# Patient Record
Sex: MALE | URBAN - METROPOLITAN AREA
[De-identification: names, ages, dates, MRNs, and addresses within clinical notes are randomized per-mention and may not be internally consistent; named-entity substitution may affect disease eponyms.]

---

## 2018-12-28 ENCOUNTER — INPATIENT (INPATIENT)
Facility: HOSPITAL | Age: 58
LOS: 1 days | Discharge: HOME | DRG: 247 | End: 2018-12-30
Attending: INTERNAL MEDICINE | Admitting: INTERNAL MEDICINE
Payer: COMMERCIAL

## 2018-12-28 VITALS
DIASTOLIC BLOOD PRESSURE: 72 MMHG | SYSTOLIC BLOOD PRESSURE: 119 MMHG | RESPIRATION RATE: 19 BRPM | HEART RATE: 92 BPM | OXYGEN SATURATION: 95 %

## 2018-12-28 DIAGNOSIS — Z91.89 OTHER SPECIFIED PERSONAL RISK FACTORS, NOT ELSEWHERE CLASSIFIED: ICD-10-CM

## 2018-12-28 DIAGNOSIS — E11.9 TYPE 2 DIABETES MELLITUS WITHOUT COMPLICATIONS: ICD-10-CM

## 2018-12-28 DIAGNOSIS — E78.5 HYPERLIPIDEMIA, UNSPECIFIED: ICD-10-CM

## 2018-12-28 DIAGNOSIS — R63.8 OTHER SYMPTOMS AND SIGNS CONCERNING FOOD AND FLUID INTAKE: ICD-10-CM

## 2018-12-28 DIAGNOSIS — I25.110 ATHEROSCLEROTIC HEART DISEASE OF NATIVE CORONARY ARTERY WITH UNSTABLE ANGINA PECTORIS: ICD-10-CM

## 2018-12-28 DIAGNOSIS — I21.3 ST ELEVATION (STEMI) MYOCARDIAL INFARCTION OF UNSPECIFIED SITE: ICD-10-CM

## 2018-12-28 DIAGNOSIS — Z29.9 ENCOUNTER FOR PROPHYLACTIC MEASURES, UNSPECIFIED: ICD-10-CM

## 2018-12-28 LAB
ALBUMIN SERPL ELPH-MCNC: 4.4 G/DL — SIGNIFICANT CHANGE UP (ref 3.3–5)
ALP SERPL-CCNC: 93 U/L — SIGNIFICANT CHANGE UP (ref 40–120)
ALT FLD-CCNC: 26 U/L — SIGNIFICANT CHANGE UP (ref 10–45)
ANION GAP SERPL CALC-SCNC: 20 MMOL/L — HIGH (ref 5–17)
APTT BLD: 53.5 SEC — HIGH (ref 27.5–36.3)
AST SERPL-CCNC: 23 U/L — SIGNIFICANT CHANGE UP (ref 10–40)
BILIRUB SERPL-MCNC: 0.3 MG/DL — SIGNIFICANT CHANGE UP (ref 0.2–1.2)
BUN SERPL-MCNC: 17 MG/DL — SIGNIFICANT CHANGE UP (ref 7–23)
CALCIUM SERPL-MCNC: 9.3 MG/DL — SIGNIFICANT CHANGE UP (ref 8.4–10.5)
CHLORIDE SERPL-SCNC: 102 MMOL/L — SIGNIFICANT CHANGE UP (ref 96–108)
CHOLEST SERPL-MCNC: 185 MG/DL — SIGNIFICANT CHANGE UP (ref 10–199)
CO2 SERPL-SCNC: 20 MMOL/L — LOW (ref 22–31)
CREAT SERPL-MCNC: 0.69 MG/DL — SIGNIFICANT CHANGE UP (ref 0.5–1.3)
GLUCOSE SERPL-MCNC: 186 MG/DL — HIGH (ref 70–99)
HBA1C BLD-MCNC: 7.9 % — HIGH (ref 4–5.6)
HCT VFR BLD CALC: 46.4 % — SIGNIFICANT CHANGE UP (ref 39–50)
HDLC SERPL-MCNC: 43 MG/DL — SIGNIFICANT CHANGE UP
HGB BLD-MCNC: 15.5 G/DL — SIGNIFICANT CHANGE UP (ref 13–17)
INR BLD: 0.97 — SIGNIFICANT CHANGE UP (ref 0.88–1.16)
LIPID PNL WITH DIRECT LDL SERPL: 117 MG/DL — SIGNIFICANT CHANGE UP
MAGNESIUM SERPL-MCNC: 2.4 MG/DL — SIGNIFICANT CHANGE UP (ref 1.6–2.6)
MCHC RBC-ENTMCNC: 30.1 PG — SIGNIFICANT CHANGE UP (ref 27–34)
MCHC RBC-ENTMCNC: 33.4 G/DL — SIGNIFICANT CHANGE UP (ref 32–36)
MCV RBC AUTO: 90.1 FL — SIGNIFICANT CHANGE UP (ref 80–100)
PLATELET # BLD AUTO: 327 K/UL — SIGNIFICANT CHANGE UP (ref 150–400)
POTASSIUM SERPL-MCNC: 4.1 MMOL/L — SIGNIFICANT CHANGE UP (ref 3.5–5.3)
POTASSIUM SERPL-SCNC: 4.1 MMOL/L — SIGNIFICANT CHANGE UP (ref 3.5–5.3)
PROT SERPL-MCNC: 7.8 G/DL — SIGNIFICANT CHANGE UP (ref 6–8.3)
PROTHROM AB SERPL-ACNC: 11 SEC — SIGNIFICANT CHANGE UP (ref 10–12.9)
RBC # BLD: 5.15 M/UL — SIGNIFICANT CHANGE UP (ref 4.2–5.8)
RBC # FLD: 13.3 % — SIGNIFICANT CHANGE UP (ref 10.3–16.9)
SODIUM SERPL-SCNC: 142 MMOL/L — SIGNIFICANT CHANGE UP (ref 135–145)
TOTAL CHOLESTEROL/HDL RATIO MEASUREMENT: 4.3 RATIO — SIGNIFICANT CHANGE UP (ref 3.4–9.6)
TRIGL SERPL-MCNC: 125 MG/DL — SIGNIFICANT CHANGE UP (ref 10–149)
TSH SERPL-MCNC: 1.74 UIU/ML — SIGNIFICANT CHANGE UP (ref 0.35–4.94)
WBC # BLD: 17.1 K/UL — HIGH (ref 3.8–10.5)
WBC # FLD AUTO: 17.1 K/UL — HIGH (ref 3.8–10.5)

## 2018-12-28 PROCEDURE — 92928 PRQ TCAT PLMT NTRAC ST 1 LES: CPT | Mod: RC

## 2018-12-28 PROCEDURE — 93458 L HRT ARTERY/VENTRICLE ANGIO: CPT | Mod: 26,XU

## 2018-12-28 RX ORDER — HEPARIN SODIUM 5000 [USP'U]/ML
5000 INJECTION INTRAVENOUS; SUBCUTANEOUS EVERY 8 HOURS
Qty: 0 | Refills: 0 | Status: DISCONTINUED | OUTPATIENT
Start: 2018-12-28 | End: 2018-12-30

## 2018-12-28 RX ORDER — ONDANSETRON 8 MG/1
4 TABLET, FILM COATED ORAL ONCE
Qty: 0 | Refills: 0 | Status: COMPLETED | OUTPATIENT
Start: 2018-12-28 | End: 2018-12-28

## 2018-12-28 RX ORDER — ASPIRIN/CALCIUM CARB/MAGNESIUM 324 MG
81 TABLET ORAL DAILY
Qty: 0 | Refills: 0 | Status: DISCONTINUED | OUTPATIENT
Start: 2018-12-29 | End: 2018-12-30

## 2018-12-28 RX ORDER — DEXTROSE 50 % IN WATER 50 %
25 SYRINGE (ML) INTRAVENOUS ONCE
Qty: 0 | Refills: 0 | Status: DISCONTINUED | OUTPATIENT
Start: 2018-12-28 | End: 2018-12-30

## 2018-12-28 RX ORDER — TICAGRELOR 90 MG/1
90 TABLET ORAL
Qty: 0 | Refills: 0 | Status: DISCONTINUED | OUTPATIENT
Start: 2018-12-29 | End: 2018-12-30

## 2018-12-28 RX ORDER — DEXTROSE 50 % IN WATER 50 %
15 SYRINGE (ML) INTRAVENOUS ONCE
Qty: 0 | Refills: 0 | Status: DISCONTINUED | OUTPATIENT
Start: 2018-12-28 | End: 2018-12-30

## 2018-12-28 RX ORDER — CHLORHEXIDINE GLUCONATE 213 G/1000ML
1 SOLUTION TOPICAL DAILY
Qty: 0 | Refills: 0 | Status: DISCONTINUED | OUTPATIENT
Start: 2018-12-28 | End: 2018-12-30

## 2018-12-28 RX ORDER — GLUCAGON INJECTION, SOLUTION 0.5 MG/.1ML
1 INJECTION, SOLUTION SUBCUTANEOUS ONCE
Qty: 0 | Refills: 0 | Status: DISCONTINUED | OUTPATIENT
Start: 2018-12-28 | End: 2018-12-30

## 2018-12-28 RX ORDER — INSULIN LISPRO 100/ML
VIAL (ML) SUBCUTANEOUS
Qty: 0 | Refills: 0 | Status: DISCONTINUED | OUTPATIENT
Start: 2018-12-28 | End: 2018-12-30

## 2018-12-28 RX ORDER — DEXTROSE 50 % IN WATER 50 %
12.5 SYRINGE (ML) INTRAVENOUS ONCE
Qty: 0 | Refills: 0 | Status: DISCONTINUED | OUTPATIENT
Start: 2018-12-28 | End: 2018-12-30

## 2018-12-28 RX ORDER — ATORVASTATIN CALCIUM 80 MG/1
80 TABLET, FILM COATED ORAL AT BEDTIME
Qty: 0 | Refills: 0 | Status: DISCONTINUED | OUTPATIENT
Start: 2018-12-29 | End: 2018-12-30

## 2018-12-28 RX ORDER — SODIUM CHLORIDE 9 MG/ML
1000 INJECTION, SOLUTION INTRAVENOUS
Qty: 0 | Refills: 0 | Status: DISCONTINUED | OUTPATIENT
Start: 2018-12-28 | End: 2018-12-30

## 2018-12-28 RX ADMIN — Medication 2: at 21:57

## 2018-12-28 RX ADMIN — ONDANSETRON 4 MILLIGRAM(S): 8 TABLET, FILM COATED ORAL at 22:42

## 2018-12-28 RX ADMIN — CHLORHEXIDINE GLUCONATE 1 APPLICATION(S): 213 SOLUTION TOPICAL at 21:23

## 2018-12-28 NOTE — H&P ADULT - NSHPSOCIALHISTORY_GEN_ALL_CORE
Pt has 20 pack year smoking history (quick smoking 25 years ago), drinks EtOH socially (1/month), denies illicit drug use.

## 2018-12-28 NOTE — PROGRESS NOTE ADULT - SUBJECTIVE AND OBJECTIVE BOX
PROCEDURE: CORONARY ANGIOGRAM, LHC and PCI  INDICATION: STEMI  ATTENDING: FLO  ACCESS: RRA    FINDINGS:  LM= wnl  Lcx= 65%  OM1= 65%  LAD= diffuse moderate non obstructive CAD  D1= diffuse, moderate, non obstructive CAD  mRCA=99%     LVEF= 35-40% with inferior wall hypokinesis  LVEDP=  10 mmHg    PROCEDURE:   PCI of mRCA with CINDY x1 (Sarath 3.0x 32) post dilated to 3.5 with NC balloon    A/P  -CCU admit  -aspirin 81 mg daily   -brilinta 90 mg po bid  -atorvastatin 80 mg daily   -echo  -aggressive medical therapy of DM and comorbidities  -consider FFR LCx and possible PCI in 1 month if anginal symptoms on optimal medical therapy

## 2018-12-28 NOTE — H&P ADULT - ASSESSMENT
58 year old male with PMH of IDDM, HLD, and hx of tobacco use disorder that presents from [facility] with chest pain x 3 days found to have inferior STEMI. Pt was transferred to [facility] for emergent cardiac catheterization and s/p 1 CINDY to [facility]. Pt admitted to CCU for monitoring and management.

## 2018-12-28 NOTE — H&P ADULT - FAMILY HISTORY
Mother  Still living? Unknown  Family history of coronary artery bypass graft, Age at diagnosis: Age Unknown  Family history of coronary artery disease, Age at diagnosis: Age Unknown     Grandparent  Still living? Unknown  Family history of coronary artery disease, Age at diagnosis: Age Unknown

## 2018-12-28 NOTE — H&P ADULT - NSHPLABSRESULTS_GEN_ALL_CORE
.  LABS:                         RADIOLOGY, EKG & ADDITIONAL TESTS: Reviewed. .  LABS:                         15.5   17.1  )-----------( 327      ( 28 Dec 2018 21:21 )             46.4     12-28    142  |  102  |  17  ----------------------------<  186<H>  4.1   |  20<L>  |  0.69    Ca    9.3      28 Dec 2018 21:21  Mg     2.4     12-28    TPro  7.8  /  Alb  4.4  /  TBili  0.3  /  DBili  x   /  AST  23  /  ALT  26  /  AlkPhos  93  12-28    PT/INR - ( 28 Dec 2018 21:21 )   PT: 11.0 sec;   INR: 0.97          PTT - ( 28 Dec 2018 21:21 )  PTT:53.5 sec              RADIOLOGY, EKG & ADDITIONAL TESTS: Reviewed.

## 2018-12-28 NOTE — H&P ADULT - PROBLEM SELECTOR PROBLEM 4
Transition of care performed with sharing of clinical summary Hyperlipidemia, unspecified hyperlipidemia type

## 2018-12-28 NOTE — H&P ADULT - PROBLEM SELECTOR PLAN 4
Pt states he has hx of HLD and take Lipitor at home. He reports his last LDL to be wnl but his PCP instructed him to continue statin.   - c/w Lipitor 80mg  - f/u Lipid Panel

## 2018-12-28 NOTE — H&P ADULT - PROBLEM SELECTOR PROBLEM 2
Need for prophylactic measure Coronary artery disease involving native coronary artery of native heart with unstable angina pectoris

## 2018-12-28 NOTE — H&P ADULT - PROBLEM SELECTOR PLAN 2
Cardiac cath demonstrated LM= wnl, Lcx= 65%, OM1= 65%, LAD= diffuse moderate non obstructive CAD, D1= diffuse, moderate, non obstructive CAD, mRCA=99%  - c/w Lipitor 80mg  - management as per above Cardiac cath demonstrated LM= wnl, Lcx= 65%, OM1= 65%, LAD= diffuse moderate non obstructive CAD, D1= diffuse, moderate, non obstructive CAD, mRCA=99%  - c/w Lipitor 80mg  - Management as per above

## 2018-12-28 NOTE — H&P ADULT - NSHPPHYSICALEXAM_GEN_ALL_CORE
Constitutional: WDWN resting comfortably in bed; NAD  Head: NC/AT  Eyes: PERRL, EOMI, anicteric sclera  ENT: no nasal discharge; uvula midline, no oropharyngeal erythema or exudates; MMM  Neck: supple; no JVD or thyromegaly  Respiratory: CTA B/L; no W/R/R, no retractions  Cardiac: +S1/S2; RRR; no M/R/G; PMI non-displaced  Gastrointestinal: soft, NT/ND; no rebound or guarding; +BSx4  Genitourinary: normal external genitalia  Back: spine midline, no bony tenderness or step-offs; no CVAT B/L  Extremities: WWP, no clubbing or cyanosis; no peripheral edema  Musculoskeletal: NROM x4; no joint swelling, tenderness or erythema  Vascular: 2+ radial, femoral, DP/PT pulses B/L  Dermatologic: skin warm, dry and intact; no rashes, wounds, or scars  Lymphatic: no submandibular or cervical LAD  Neurologic: AAOx3; CNII-XII grossly intact; no focal deficits  Psychiatric: affect and characteristics of appearance, verbalizations, behaviors are appropriate Constitutional: WDWN resting comfortably in bed; NAD  Head: NC/AT  Eyes: PERRL, EOMI, anicteric sclera  ENT: no nasal discharge; uvula midline, no oropharyngeal erythema or exudates; MMM  Neck: supple; no JVD or thyromegaly  Respiratory: CTA B/L; no W/R/R, no retractions  Cardiac: +S1/S2; RRR; no M/R/G; PMI non-displaced  Gastrointestinal: soft, NT/ND; no rebound or guarding; +BSx4  Extremities: WWP, no clubbing or cyanosis; no peripheral edema, R radial band in place   Vascular: 2+ radial, femoral, DP/PT pulses B/L  Dermatologic: skin warm, dry and intact; no rashes, wounds, or scars  Neurologic: AAOx3; CNII-XII grossly intact; no focal deficits

## 2018-12-28 NOTE — H&P ADULT - HISTORY OF PRESENT ILLNESS
58 year old male with PMH 58 year old male with PMH of IDDM, HLD, and hx of tobacco use disorder that presents from [facility] for inferior STEMI. Pt states he has been experiencing chest pain x 3 days. Pt states he was experiencing 8/10 substernal chest pain with diaphoresis and associated numbness and tingling in bilateral UE.  He initially thought it was acid reflux and took TUMS with some alleviation. On the third day he was driving back home from shopping with return of symptoms which did not alleviate with TUMS which prompted him to go to the ED. At [place] pt was found to have an inferior wall STEMI (II, III, AVF) and STEMI code was activated. Pt was transferred to [facility] for emergent cardiac catheterization 58 year old male with PMH of IDDM, HLD, and hx of tobacco use disorder that presents from [facility] for inferior STEMI. Pt states he has been experiencing chest pain x 3 days. Pt states he was experiencing 8/10 substernal chest pain with diaphoresis and associated numbness and tingling in bilateral UE.  He initially thought it was acid reflux and took TUMS with some alleviation. On the third day he was driving back home from shopping with return of symptoms which did not alleviate with TUMS which prompted him to go to the ED. At [place] pt was found to have an inferior wall STEMI (II, III, AVF) and STEMI code was activated. Pt was transferred to [facility] for emergent cardiac catheterization. Cardiac cath demonstrated LM= wnl, Lcx= 65%, OM1= 65%, LAD= diffuse moderate non obstructive CAD, D1= diffuse, moderate, non obstructive CAD, mRCA=99%, LVEF= 35-40% with inferior wall hypokinesis, LVEDP=  10 mmHg. Pt received 1 CINDY to mRCA. Pt denies CP, SOB, Palpitation, F, Chills, or N/V.  Pt is being admitted to CCU for further monitoring and management. 58 year old male with PMH of IDDM, HLD, and hx of tobacco use disorder that presents from [facility] for inferior STEMI. Pt states he has been experiencing chest pain x 3 days. Pt states he was experiencing 8/10 substernal chest pain with diaphoresis and associated numbness and tingling in bilateral UE.  He initially thought it was acid reflux and took TUMS with some alleviation. On the third day he was driving back home from shopping with return of symptoms which did not alleviate with TUMS which prompted him to go to the ED. At [place] pt was found to have an inferior wall STEMI (II, III, AVF) and STEMI code was activated. Pt was transferred to [facility] for emergent cardiac catheterization. Cardiac cath demonstrated LM= wnl, Lcx= 65%, OM1= 65%, LAD= diffuse moderate non obstructive CAD, D1= diffuse, moderate, non obstructive CAD, mRCA=99%, LVEF= 35-40% with inferior wall hypokinesis, LVEDP=  10 mmHg. Pt received 1 CINDY to mRCA. Pt denies CP, SOB, Palpitation, F, Chills, or N/V.  Pt is being admitted to CCU for observation and further management.

## 2018-12-28 NOTE — H&P ADULT - PROBLEM SELECTOR PLAN 3
Pt states he has a hx of poorly controlled IDDM. Pt is unsure about what medications he takes. Pt admits he went to PCP 1 month ago but did not follow up on lab work.  - pharmacy closed , try to obtain collateral in AM  - will start on ISS   - f/u Hga1c Pt states he has a hx of poorly controlled IDDM. Pt is unsure about what medications he takes. Pt admits he went to PCP 1 month ago but did not follow up on lab work.  - Pt goes to [facility] in [facility]; pharmacy closed , try to obtain collateral in AM  - will start on ISS   - f/u Hga1c Pt states he has a hx of poorly controlled IDDM. Pt is unsure about what medications he takes. Pt admits he went to PCP 1 month ago but did not follow up on lab work.  - Pt goes to [facility] in [facility]; pharmacy closed , try to obtain collateral in AM  - Will start on ISS   - f/u Hga1c

## 2018-12-28 NOTE — H&P ADULT - PROBLEM SELECTOR PLAN 1
Pt presents from [facility] with chest pain x 3 days found to have inferior STEMI (II, III, aVF) s/p 1 CIDNY to mRCA. Pt presents from [facility] with chest pain x 3 days found to have inferior STEMI (II, III, aVF) [facility]  - pt was ASA and Brilinta loaded  -ASA 81 mg daily   -brilinta 90 mg po bid starting in AM   - atorvastatin 80mg  - f/u CBC, CMP, Lipid Panel. Hga1c, and TSH   - obtain formal Echo   - daily EKG and CXR  - monitor on Telemetry   - will possibly need PCI to LCx in 1 month if pt continues to have anginal symptoms on optimal therapy. Pt presents from [facility] with chest pain x 3 days found to have inferior STEMI (II, III, aVF) [facility]  - pt was ASA and Brilinta loaded  -ASA 81 mg daily   -Brilinta 90 mg po bid starting in AM   - Atorvastatin 80mg  - f/u CBC, CMP, Lipid Panel. Hga1c, and TSH   - Obtain formal Echo   - Daily EKG and CXR  - Monitor on Telemetry   - Will possibly need PCI to LCx in 1 month if pt continues to have anginal symptoms on optimal therapy.

## 2018-12-29 LAB
ANION GAP SERPL CALC-SCNC: 17 MMOL/L — SIGNIFICANT CHANGE UP (ref 5–17)
BUN SERPL-MCNC: 15 MG/DL — SIGNIFICANT CHANGE UP (ref 7–23)
CALCIUM SERPL-MCNC: 9.7 MG/DL — SIGNIFICANT CHANGE UP (ref 8.4–10.5)
CHLORIDE SERPL-SCNC: 101 MMOL/L — SIGNIFICANT CHANGE UP (ref 96–108)
CO2 SERPL-SCNC: 23 MMOL/L — SIGNIFICANT CHANGE UP (ref 22–31)
CREAT SERPL-MCNC: 0.72 MG/DL — SIGNIFICANT CHANGE UP (ref 0.5–1.3)
GLUCOSE SERPL-MCNC: 183 MG/DL — HIGH (ref 70–99)
HCT VFR BLD CALC: 44.2 % — SIGNIFICANT CHANGE UP (ref 39–50)
HGB BLD-MCNC: 14.8 G/DL — SIGNIFICANT CHANGE UP (ref 13–17)
MAGNESIUM SERPL-MCNC: 2.4 MG/DL — SIGNIFICANT CHANGE UP (ref 1.6–2.6)
MCHC RBC-ENTMCNC: 30.5 PG — SIGNIFICANT CHANGE UP (ref 27–34)
MCHC RBC-ENTMCNC: 33.5 G/DL — SIGNIFICANT CHANGE UP (ref 32–36)
MCV RBC AUTO: 90.9 FL — SIGNIFICANT CHANGE UP (ref 80–100)
PLATELET # BLD AUTO: 304 K/UL — SIGNIFICANT CHANGE UP (ref 150–400)
POTASSIUM SERPL-MCNC: 4.2 MMOL/L — SIGNIFICANT CHANGE UP (ref 3.5–5.3)
POTASSIUM SERPL-SCNC: 4.2 MMOL/L — SIGNIFICANT CHANGE UP (ref 3.5–5.3)
RBC # BLD: 4.86 M/UL — SIGNIFICANT CHANGE UP (ref 4.2–5.8)
RBC # FLD: 13.5 % — SIGNIFICANT CHANGE UP (ref 10.3–16.9)
SODIUM SERPL-SCNC: 141 MMOL/L — SIGNIFICANT CHANGE UP (ref 135–145)
WBC # BLD: 11.9 K/UL — HIGH (ref 3.8–10.5)
WBC # FLD AUTO: 11.9 K/UL — HIGH (ref 3.8–10.5)

## 2018-12-29 PROCEDURE — 93306 TTE W/DOPPLER COMPLETE: CPT | Mod: 26,76

## 2018-12-29 PROCEDURE — 71045 X-RAY EXAM CHEST 1 VIEW: CPT | Mod: 26

## 2018-12-29 RX ORDER — METOPROLOL TARTRATE 50 MG
12.5 TABLET ORAL
Qty: 0 | Refills: 0 | Status: DISCONTINUED | OUTPATIENT
Start: 2018-12-29 | End: 2018-12-29

## 2018-12-29 RX ORDER — LISINOPRIL 2.5 MG/1
2.5 TABLET ORAL DAILY
Qty: 0 | Refills: 0 | Status: DISCONTINUED | OUTPATIENT
Start: 2018-12-29 | End: 2018-12-30

## 2018-12-29 RX ORDER — SODIUM CHLORIDE 9 MG/ML
250 INJECTION INTRAMUSCULAR; INTRAVENOUS; SUBCUTANEOUS ONCE
Qty: 0 | Refills: 0 | Status: COMPLETED | OUTPATIENT
Start: 2018-12-29 | End: 2018-12-29

## 2018-12-29 RX ADMIN — TICAGRELOR 90 MILLIGRAM(S): 90 TABLET ORAL at 06:29

## 2018-12-29 RX ADMIN — TICAGRELOR 90 MILLIGRAM(S): 90 TABLET ORAL at 18:16

## 2018-12-29 RX ADMIN — Medication 2: at 11:45

## 2018-12-29 RX ADMIN — Medication 81 MILLIGRAM(S): at 18:16

## 2018-12-29 RX ADMIN — ATORVASTATIN CALCIUM 80 MILLIGRAM(S): 80 TABLET, FILM COATED ORAL at 21:51

## 2018-12-29 RX ADMIN — Medication 12.5 MILLIGRAM(S): at 00:12

## 2018-12-29 RX ADMIN — Medication 4: at 16:46

## 2018-12-29 RX ADMIN — HEPARIN SODIUM 5000 UNIT(S): 5000 INJECTION INTRAVENOUS; SUBCUTANEOUS at 06:29

## 2018-12-29 RX ADMIN — Medication 4: at 07:24

## 2018-12-29 RX ADMIN — HEPARIN SODIUM 5000 UNIT(S): 5000 INJECTION INTRAVENOUS; SUBCUTANEOUS at 14:19

## 2018-12-29 RX ADMIN — Medication 4: at 21:50

## 2018-12-29 RX ADMIN — LISINOPRIL 2.5 MILLIGRAM(S): 2.5 TABLET ORAL at 11:58

## 2018-12-29 RX ADMIN — HEPARIN SODIUM 5000 UNIT(S): 5000 INJECTION INTRAVENOUS; SUBCUTANEOUS at 21:51

## 2018-12-29 RX ADMIN — CHLORHEXIDINE GLUCONATE 1 APPLICATION(S): 213 SOLUTION TOPICAL at 18:16

## 2018-12-29 RX ADMIN — SODIUM CHLORIDE 1000 MILLILITER(S): 9 INJECTION INTRAMUSCULAR; INTRAVENOUS; SUBCUTANEOUS at 01:33

## 2018-12-29 NOTE — PROGRESS NOTE ADULT - ATTENDING COMMENTS
58M w/ DM, HTN, HLD, former smoker tx'd from [facility] w/ inf STEMI, admitted to CCU for further mgmt    -s/p LHC w/ CINDY to mRCA  -O/n had some mild chest discomfort, no new EKG changes  -Currently CP free and HD stable  -c/w DAPT and Lipitor  -Will start low dose BB when HR tolerates(Sinus michelle)  -Start Lisinopril 2.5 daily  -f/u TTE  -Insulin SS  -DASH Diet  -OOB to chair  -Dispo: CCU; Possible d/c home tmrw    Isaura Alvarado MD  CCU Attending

## 2018-12-29 NOTE — PROGRESS NOTE ADULT - PROBLEM SELECTOR PLAN 2
Cardiac cath demonstrated LM= wnl, Lcx= 65%, OM1= 65%, LAD= diffuse moderate non obstructive CAD, D1= diffuse, moderate, non obstructive CAD, mRCA=99%  - c/w Lipitor 80mg  - Management as per above

## 2018-12-29 NOTE — PROGRESS NOTE ADULT - PROBLEM SELECTOR PLAN 4
Pt states he has hx of HLD and take Lipitor at home. He reports his last LDL to be wnl but his PCP instructed him to continue statin.   - c/w Lipitor 80mg  - f/u Lipid Panel Pt states he has hx of HLD and take Lipitor at home. He reports his last LDL to be wnl but his PCP instructed him to continue statin.   - c/w Lipitor 80mg  - lipids wnl

## 2018-12-29 NOTE — PROGRESS NOTE ADULT - SUBJECTIVE AND OBJECTIVE BOX
INTERVENTIONAL CARDIOLOGY FOLLOW UP NOTE    -Patient seen and examined this am  -No events overnight  -No complaints this am    VASCULAR ACCESS EXAM:     RADIAL:   2+ right/left radial pulse   Normal capillary refill. No evidence of motor or sensory deficits of digits, hand, wrist or forearm.   -Access site clean, non-tender, without ecchymosis or hematoma.    A/P  S/p PCI via radial approach with no evidence of vascular complications post procedure.     -continue with current medications including dual antiplatelet therapy   -discharge instructions as per protocol   -outpatient follow up with primary cardiologist

## 2018-12-29 NOTE — PROGRESS NOTE ADULT - ASSESSMENT
58 year old male with PMH of IDDM, HLD, and hx of tobacco use disorder that presents from [facility] with chest pain x 3 days found to have inferior STEMI. Pt was transferred to [facility] for emergent cardiac catheterization and s/p 1 CINDY to [facility]. Pt admitted to CCU for monitoring and management.

## 2018-12-29 NOTE — PROGRESS NOTE ADULT - PROBLEM SELECTOR PLAN 1
Pt presents from [facility] with chest pain x 3 days found to have inferior STEMI (II, III, aVF) [facility]  - pt was ASA and Brilinta loaded  -ASA 81 mg daily   -Brilinta 90 mg po bid starting in AM   - Atorvastatin 80mg  - f/u CBC, CMP, Lipid Panel. Hga1c, and TSH   - Obtain formal Echo   - Daily EKG and CXR  - Monitor on Telemetry   - Will possibly need PCI to LCx in 1 month if pt continues to have anginal symptoms on optimal therapy. Pt presents from [facility] with chest pain x 3 days found to have inferior STEMI (II, III, aVF) s/p 1 CINDY to mRCA.  - pt was ASA and Brilinta loaded  -ASA 81 mg daily   -Brilinta 90 mg po bid starting in AM   - 2.5mg lisinopril  - will start lopressor when HR can tolerate  - Atorvastatin 80mg  - f/u CBC, CMP, Lipid Panel. Hga1c, and TSH   - Obtain formal Echo   - Daily EKG and CXR  - Monitor on Telemetry   - Will possibly need PCI to LCx in 1 month if pt continues to have anginal symptoms on optimal therapy.

## 2018-12-29 NOTE — PROGRESS NOTE ADULT - PROBLEM SELECTOR PLAN 3
Pt states he has a hx of poorly controlled IDDM. Pt is unsure about what medications he takes. Pt admits he went to PCP 1 month ago but did not follow up on lab work.  - Pt goes to [facility]; pharmacy closed , try to obtain collateral in AM  - Will start on ISS   - f/u Hga1c Pt states he has a hx of poorly controlled IDDM. Pt is unsure about what medications he takes. Pt admits he went to PCP 1 month ago but did not follow up on lab work.  - Pt goes to [facility] in [facility]; pharmacy did not . Will reattempt in AM.  - ISS   - Hga1c 7.9

## 2018-12-29 NOTE — PROGRESS NOTE ADULT - SUBJECTIVE AND OBJECTIVE BOX
OVERNIGHT EVENTS:    SUBJECTIVE / INTERVAL HPI: Patient seen and examined at bedside.     VITAL SIGNS:  Vital Signs Last 24 Hrs  T(C): 37.1 (29 Dec 2018 05:07), Max: 37.1 (29 Dec 2018 05:07)  T(F): 98.7 (29 Dec 2018 05:07), Max: 98.7 (29 Dec 2018 05:07)  HR: 48 (29 Dec 2018 07:00) (44 - 102)  BP: 124/62 (29 Dec 2018 07:00) (94/73 - 140/76)  BP(mean): 95 (29 Dec 2018 07:00) (74 - 99)  RR: 20 (29 Dec 2018 07:00) (16 - 28)  SpO2: 98% (29 Dec 2018 07:00) (95% - 99%)    PHYSICAL EXAM:    General: WDWN  HEENT: NC/AT; PERRL, anicteric sclera; MMM  Neck: supple  Cardiovascular: +S1/S2, RRR  Respiratory: CTA B/L; no W/R/R  Gastrointestinal: soft, NT/ND; +BSx4  Extremities: WWP; no edema, clubbing or cyanosis  Vascular: 2+ radial, DP/PT pulses B/L  Neurological: AAOx3; no focal deficits    MEDICATIONS:  MEDICATIONS  (STANDING):  aspirin enteric coated 81 milliGRAM(s) Oral daily  atorvastatin 80 milliGRAM(s) Oral at bedtime  chlorhexidine 2% Cloths 1 Application(s) Topical daily  dextrose 5%. 1000 milliLiter(s) (50 mL/Hr) IV Continuous <Continuous>  dextrose 50% Injectable 12.5 Gram(s) IV Push once  dextrose 50% Injectable 25 Gram(s) IV Push once  dextrose 50% Injectable 25 Gram(s) IV Push once  heparin  Injectable 5000 Unit(s) SubCutaneous every 8 hours  insulin lispro (HumaLOG) corrective regimen sliding scale   SubCutaneous Before meals and at bedtime  metoprolol tartrate 12.5 milliGRAM(s) Oral two times a day  ticagrelor 90 milliGRAM(s) Oral two times a day    MEDICATIONS  (PRN):  dextrose 40% Gel 15 Gram(s) Oral once PRN Blood Glucose LESS THAN 70 milliGRAM(s)/deciliter  glucagon  Injectable 1 milliGRAM(s) IntraMuscular once PRN Glucose LESS THAN 70 milligrams/deciliter      ALLERGIES:  Allergies    No Known Allergies    Intolerances        LABS:                        14.8   11.9  )-----------( 304      ( 29 Dec 2018 06:26 )             44.2     12-29    141  |  101  |  15  ----------------------------<  183<H>  4.2   |  23  |  0.72    Ca    9.7      29 Dec 2018 06:26  Mg     2.4     12-29    TPro  7.8  /  Alb  4.4  /  TBili  0.3  /  DBili  x   /  AST  23  /  ALT  26  /  AlkPhos  93  12-28    PT/INR - ( 28 Dec 2018 21:21 )   PT: 11.0 sec;   INR: 0.97          PTT - ( 28 Dec 2018 21:21 )  PTT:53.5 sec    CAPILLARY BLOOD GLUCOSE      POCT Blood Glucose.: 215 mg/dL (29 Dec 2018 06:27)      RADIOLOGY & ADDITIONAL TESTS: Reviewed. OVERNIGHT EVENTS: Arrived to CCU from cath lab HDS, complained at 10:40PM of nausea, lightheadedness and 4/10 chest pain, explained to patient that was not unusual for inferior wall lesion and was given zofran 4mg IV which helped, 3 beats of vtach x2 and later another 6 beats while fellow removing radial band, started on lopressor 12.5mg BID, had another episode of chest pain and repeat EKG without any changes, had 4 more beats of vtach and AIVR-given 250cc bolus, 6 more beats of vtach    SUBJECTIVE / INTERVAL HPI: Patient seen and examined at bedside.     VITAL SIGNS:  Vital Signs Last 24 Hrs  T(C): 37.1 (29 Dec 2018 05:07), Max: 37.1 (29 Dec 2018 05:07)  T(F): 98.7 (29 Dec 2018 05:07), Max: 98.7 (29 Dec 2018 05:07)  HR: 48 (29 Dec 2018 07:00) (44 - 102)  BP: 124/62 (29 Dec 2018 07:00) (94/73 - 140/76)  BP(mean): 95 (29 Dec 2018 07:00) (74 - 99)  RR: 20 (29 Dec 2018 07:00) (16 - 28)  SpO2: 98% (29 Dec 2018 07:00) (95% - 99%)    PHYSICAL EXAM:    General: WDWN  HEENT: NC/AT; PERRL, anicteric sclera; MMM  Neck: supple  Cardiovascular: +S1/S2, RRR  Respiratory: CTA B/L; no W/R/R  Gastrointestinal: soft, NT/ND; +BSx4  Extremities: WWP; no edema, clubbing or cyanosis  Vascular: 2+ radial, DP/PT pulses B/L  Neurological: AAOx3; no focal deficits    MEDICATIONS:  MEDICATIONS  (STANDING):  aspirin enteric coated 81 milliGRAM(s) Oral daily  atorvastatin 80 milliGRAM(s) Oral at bedtime  chlorhexidine 2% Cloths 1 Application(s) Topical daily  dextrose 5%. 1000 milliLiter(s) (50 mL/Hr) IV Continuous <Continuous>  dextrose 50% Injectable 12.5 Gram(s) IV Push once  dextrose 50% Injectable 25 Gram(s) IV Push once  dextrose 50% Injectable 25 Gram(s) IV Push once  heparin  Injectable 5000 Unit(s) SubCutaneous every 8 hours  insulin lispro (HumaLOG) corrective regimen sliding scale   SubCutaneous Before meals and at bedtime  metoprolol tartrate 12.5 milliGRAM(s) Oral two times a day  ticagrelor 90 milliGRAM(s) Oral two times a day    MEDICATIONS  (PRN):  dextrose 40% Gel 15 Gram(s) Oral once PRN Blood Glucose LESS THAN 70 milliGRAM(s)/deciliter  glucagon  Injectable 1 milliGRAM(s) IntraMuscular once PRN Glucose LESS THAN 70 milligrams/deciliter      ALLERGIES:  Allergies    No Known Allergies    Intolerances        LABS:                        14.8   11.9  )-----------( 304      ( 29 Dec 2018 06:26 )             44.2     12-29    141  |  101  |  15  ----------------------------<  183<H>  4.2   |  23  |  0.72    Ca    9.7      29 Dec 2018 06:26  Mg     2.4     12-29    TPro  7.8  /  Alb  4.4  /  TBili  0.3  /  DBili  x   /  AST  23  /  ALT  26  /  AlkPhos  93  12-28    PT/INR - ( 28 Dec 2018 21:21 )   PT: 11.0 sec;   INR: 0.97          PTT - ( 28 Dec 2018 21:21 )  PTT:53.5 sec    CAPILLARY BLOOD GLUCOSE      POCT Blood Glucose.: 215 mg/dL (29 Dec 2018 06:27)      RADIOLOGY & ADDITIONAL TESTS: Reviewed. OVERNIGHT EVENTS: Arrived to CCU from cath lab HDS, complained at 10:40PM of nausea, lightheadedness and 4/10 chest pain, explained to patient that was not unusual for inferior wall lesion and was given zofran 4mg IV which helped, 3 beats of vtach x2 and later another 6 beats while fellow removing radial band, started on lopressor 12.5mg BID, had another episode of chest pain and repeat EKG without any changes, had 4 more beats of vtach and AIVR-given 250cc bolus, 6 more beats of vtach    SUBJECTIVE / INTERVAL HPI: Patient seen and examined at bedside. Had several episodes of mild (3-4/10) chest pain overnight. Also had occasional shortness of breath. Had episodes of ectopy today with 15 beats of NSVT at the longest. Had one episode of abdominal pain during the day at 10AM, EKG unchanged.     VITAL SIGNS:  Vital Signs Last 24 Hrs  T(C): 37.1 (29 Dec 2018 05:07), Max: 37.1 (29 Dec 2018 05:07)  T(F): 98.7 (29 Dec 2018 05:07), Max: 98.7 (29 Dec 2018 05:07)  HR: 48 (29 Dec 2018 07:00) (44 - 102)  BP: 124/62 (29 Dec 2018 07:00) (94/73 - 140/76)  BP(mean): 95 (29 Dec 2018 07:00) (74 - 99)  RR: 20 (29 Dec 2018 07:00) (16 - 28)  SpO2: 98% (29 Dec 2018 07:00) (95% - 99%)    PHYSICAL EXAM:    General: WDWN in NAD  HEENT: NC/AT; PERRL, anicteric sclera; MMM  Neck: supple  Cardiovascular: +S1/S2, RRR  Respiratory: CTA B/L; no W/R/R  Gastrointestinal: soft, NT/ND; +BSx4  Extremities: WWP; no edema, clubbing or cyanosis  Vascular: 2+ radial, DP/PT pulses B/L  Neurological: AAOx3; no focal deficits    MEDICATIONS:  MEDICATIONS  (STANDING):  aspirin enteric coated 81 milliGRAM(s) Oral daily  atorvastatin 80 milliGRAM(s) Oral at bedtime  chlorhexidine 2% Cloths 1 Application(s) Topical daily  dextrose 5%. 1000 milliLiter(s) (50 mL/Hr) IV Continuous <Continuous>  dextrose 50% Injectable 12.5 Gram(s) IV Push once  dextrose 50% Injectable 25 Gram(s) IV Push once  dextrose 50% Injectable 25 Gram(s) IV Push once  heparin  Injectable 5000 Unit(s) SubCutaneous every 8 hours  insulin lispro (HumaLOG) corrective regimen sliding scale   SubCutaneous Before meals and at bedtime  metoprolol tartrate 12.5 milliGRAM(s) Oral two times a day  ticagrelor 90 milliGRAM(s) Oral two times a day    MEDICATIONS  (PRN):  dextrose 40% Gel 15 Gram(s) Oral once PRN Blood Glucose LESS THAN 70 milliGRAM(s)/deciliter  glucagon  Injectable 1 milliGRAM(s) IntraMuscular once PRN Glucose LESS THAN 70 milligrams/deciliter      ALLERGIES:  Allergies    No Known Allergies    Intolerances        LABS:                        14.8   11.9  )-----------( 304      ( 29 Dec 2018 06:26 )             44.2     12-29    141  |  101  |  15  ----------------------------<  183<H>  4.2   |  23  |  0.72    Ca    9.7      29 Dec 2018 06:26  Mg     2.4     12-29    TPro  7.8  /  Alb  4.4  /  TBili  0.3  /  DBili  x   /  AST  23  /  ALT  26  /  AlkPhos  93  12-28    PT/INR - ( 28 Dec 2018 21:21 )   PT: 11.0 sec;   INR: 0.97          PTT - ( 28 Dec 2018 21:21 )  PTT:53.5 sec    CAPILLARY BLOOD GLUCOSE      POCT Blood Glucose.: 215 mg/dL (29 Dec 2018 06:27)      RADIOLOGY & ADDITIONAL TESTS: Reviewed.

## 2018-12-30 ENCOUNTER — INBOUND DOCUMENT (OUTPATIENT)
Age: 58
End: 2018-12-30

## 2018-12-30 VITALS
OXYGEN SATURATION: 95 % | HEART RATE: 92 BPM | RESPIRATION RATE: 27 BRPM | DIASTOLIC BLOOD PRESSURE: 67 MMHG | SYSTOLIC BLOOD PRESSURE: 101 MMHG

## 2018-12-30 LAB
ANION GAP SERPL CALC-SCNC: 19 MMOL/L — HIGH (ref 5–17)
BUN SERPL-MCNC: 19 MG/DL — SIGNIFICANT CHANGE UP (ref 7–23)
CALCIUM SERPL-MCNC: 8.4 MG/DL — SIGNIFICANT CHANGE UP (ref 8.4–10.5)
CHLORIDE SERPL-SCNC: 101 MMOL/L — SIGNIFICANT CHANGE UP (ref 96–108)
CO2 SERPL-SCNC: 17 MMOL/L — LOW (ref 22–31)
CREAT SERPL-MCNC: 0.74 MG/DL — SIGNIFICANT CHANGE UP (ref 0.5–1.3)
GLUCOSE SERPL-MCNC: 230 MG/DL — HIGH (ref 70–99)
POTASSIUM SERPL-MCNC: 4 MMOL/L — SIGNIFICANT CHANGE UP (ref 3.5–5.3)
POTASSIUM SERPL-SCNC: 4 MMOL/L — SIGNIFICANT CHANGE UP (ref 3.5–5.3)
SODIUM SERPL-SCNC: 137 MMOL/L — SIGNIFICANT CHANGE UP (ref 135–145)

## 2018-12-30 PROCEDURE — 71045 X-RAY EXAM CHEST 1 VIEW: CPT | Mod: 26

## 2018-12-30 RX ORDER — INSULIN GLARGINE 100 [IU]/ML
25 INJECTION, SOLUTION SUBCUTANEOUS
Qty: 1 | Refills: 0 | OUTPATIENT
Start: 2018-12-30

## 2018-12-30 RX ORDER — LISINOPRIL 2.5 MG/1
1 TABLET ORAL
Qty: 30 | Refills: 0 | OUTPATIENT
Start: 2018-12-30

## 2018-12-30 RX ORDER — ASPIRIN/CALCIUM CARB/MAGNESIUM 324 MG
1 TABLET ORAL
Qty: 30 | Refills: 0 | OUTPATIENT
Start: 2018-12-30

## 2018-12-30 RX ORDER — METOPROLOL TARTRATE 50 MG
12.5 TABLET ORAL ONCE
Qty: 0 | Refills: 0 | Status: DISCONTINUED | OUTPATIENT
Start: 2018-12-30 | End: 2018-12-30

## 2018-12-30 RX ORDER — ATORVASTATIN CALCIUM 80 MG/1
1 TABLET, FILM COATED ORAL
Qty: 30 | Refills: 0 | OUTPATIENT
Start: 2018-12-30

## 2018-12-30 RX ORDER — METFORMIN HYDROCHLORIDE 850 MG/1
1 TABLET ORAL
Qty: 60 | Refills: 0 | OUTPATIENT
Start: 2018-12-30 | End: 2019-01-28

## 2018-12-30 RX ORDER — TICAGRELOR 90 MG/1
1 TABLET ORAL
Qty: 180 | Refills: 4 | OUTPATIENT
Start: 2018-12-30 | End: 2020-03-23

## 2018-12-30 RX ORDER — METOPROLOL TARTRATE 50 MG
12.5 TABLET ORAL EVERY 12 HOURS
Qty: 0 | Refills: 0 | Status: DISCONTINUED | OUTPATIENT
Start: 2018-12-30 | End: 2018-12-30

## 2018-12-30 RX ORDER — TICAGRELOR 90 MG/1
1 TABLET ORAL
Qty: 60 | Refills: 0 | OUTPATIENT
Start: 2018-12-30 | End: 2019-01-28

## 2018-12-30 RX ORDER — METOPROLOL TARTRATE 50 MG
1 TABLET ORAL
Qty: 30 | Refills: 0 | OUTPATIENT
Start: 2018-12-30

## 2018-12-30 RX ORDER — METOPROLOL TARTRATE 50 MG
25 TABLET ORAL DAILY
Qty: 0 | Refills: 0 | Status: DISCONTINUED | OUTPATIENT
Start: 2018-12-31 | End: 2018-12-30

## 2018-12-30 RX ADMIN — HEPARIN SODIUM 5000 UNIT(S): 5000 INJECTION INTRAVENOUS; SUBCUTANEOUS at 14:31

## 2018-12-30 RX ADMIN — LISINOPRIL 2.5 MILLIGRAM(S): 2.5 TABLET ORAL at 06:40

## 2018-12-30 RX ADMIN — Medication 12.5 MILLIGRAM(S): at 09:25

## 2018-12-30 RX ADMIN — TICAGRELOR 90 MILLIGRAM(S): 90 TABLET ORAL at 06:40

## 2018-12-30 RX ADMIN — HEPARIN SODIUM 5000 UNIT(S): 5000 INJECTION INTRAVENOUS; SUBCUTANEOUS at 06:40

## 2018-12-30 RX ADMIN — Medication 4: at 11:45

## 2018-12-30 RX ADMIN — Medication 2: at 06:40

## 2018-12-30 NOTE — DISCHARGE NOTE ADULT - PROVIDER TOKENS
TOKEN:'[id number]',FREE:[LAST:[[facility]],FIRST:[[facility]],PHONE:[phone]],FAX:[(   [phone]    -],ADDRESS:[[facility]  [address] [place] [address], [address] [place] [address]    Please call  to make an appointment as soon as possible.]]

## 2018-12-30 NOTE — DISCHARGE NOTE ADULT - CARE PLAN
Principal Discharge DX:	ST elevation myocardial infarction involving right coronary artery  Goal:	Improvement in your chest pain and prevention of future heart attack  Assessment and plan of treatment:	You were admitted to the hospital with an occlusion of an artery that supplies your heart, also known as a heart attack. You underwent a procedure called cardiac catheterization and had a stent placed in your right coronary artery. You were started on medications to keep this stent open, called aspirin and brilinta. It is extremely important that you take these on a daily basis to keep the stent open. If you do not take them, the stent could close and you could have another heart attack. Please take all medications as prescribed and make an appt to follow up with Dr. Douglas in 1-2 weeks.  Secondary Diagnosis:	Coronary artery disease involving native coronary artery of native heart with unstable angina pectoris  Goal:	Maintenance of heart health  Assessment and plan of treatment:	You were started on aspirin, brilinta, lipitor, metoprolol and lisinopril for coronary artery disease. Please continue to take these medications as prescribed and follow up with cardiology (Dr. Douglas) and your primary care doctor in 1-2 weeks.  Secondary Diagnosis:	Hyperlipidemia, unspecified hyperlipidemia type  Goal:	Maintenance of healthy blood levels of fats  Assessment and plan of treatment:	You came to the hospital with a history of high cholesterol. Please continue to take medications as prescribed and follow up with your primary care doctor and cardiologist.  Secondary Diagnosis:	Insulin dependent diabetes mellitus  Goal:	Maintenance of healthy blood sugar levels  Assessment and plan of treatment:	You came to the hospital with a history of diabetes. You were seen by a diabetes doctor in the hospital (Endocrinologist). Please continue to take your basaglar 25u every night and metformin 1000mg twice a day. Please make an appt to follow-up with [facility] by calling  to make an appointment as soon as possible.

## 2018-12-30 NOTE — DISCHARGE NOTE ADULT - PLAN OF CARE
Improvement in your chest pain and prevention of future heart attack You were admitted to the hospital with an occlusion of an artery that supplies your heart, also known as a heart attack. You underwent a procedure called cardiac catheterization and had a stent placed in your right coronary artery. You were started on medications to keep this stent open, called aspirin and brilinta. It is extremely important that you take these on a daily basis to keep the stent open. If you do not take them, the stent could close and you could have another heart attack. Please take all medications as prescribed and make an appt to follow up with Dr. Douglas in 1-2 weeks. Maintenance of heart health You were started on aspirin, brilinta, lipitor, metoprolol and lisinopril for coronary artery disease. Please continue to take these medications as prescribed and follow up with cardiology (Dr. Douglas) and your primary care doctor in 1-2 weeks. Maintenance of healthy blood levels of fats You came to the hospital with a history of high cholesterol. Please continue to take medications as prescribed and follow up with your primary care doctor and cardiologist. Maintenance of healthy blood sugar levels You came to the hospital with a history of diabetes. You were seen by a diabetes doctor in the hospital (Endocrinologist). Please continue to take your basaglar 25u every night and metformin 1000mg twice a day. Please make an appt to follow-up with [facility] by calling  to make an appointment as soon as possible.

## 2018-12-30 NOTE — DISCHARGE NOTE ADULT - PATIENT PORTAL LINK FT
You can access the [facility] Patient Portal, offered by [facility], by registering with the following website: [email or web address]

## 2018-12-30 NOTE — CONSULT NOTE ADULT - SUBJECTIVE AND OBJECTIVE BOX
HPI: 58 year old male with PMH of type 2 DM (Hg A1c 7.9%) HLD, and hx of tobacco use disorder that admitted from [facility] for inferior STEMI. He is s/p CINDY to [facility]. Plan is for discharge home today.    Age at Dx: Diagnosed around 2012  Hx and duration of insulin/current therapy: He states he was well controlled on metformin 1000 mg twice daily and Tradjenta 5 mg daily. Unfortunately, his insurance changed and Tradjenta no longer covered. He was changed to another oral medication that was not effective, and subsequently on a regimen of Basaglar 25 units at night, metformin 1000 mg twice daily, glipizide unknown dose at night, another oral medication in the morning. His pharmacy is currently closed. His medication list is in his car, left behind when he developed chest pain and presented for immediate evaluation.  No hospitalizations for hypo- or hyperglycemia.    He checks blood sugars twice a day at home, most recently in the 200s mg/dL. He states blood sugars were well controlled on metformin and Tradjenta with all at goal and Hg A1c 6.8%.    Hx of other regimens  Complications: CAD; no microvascular complications per his report  Outpatient Endo: None, PCP    PMH & Surgical Hx:STEMI  Family history of coronary artery disease (Mother, Grandparent)  Family history of coronary artery bypass graft (Mother)  Handoff  Other hyperlipidemia  Insulin dependent diabetes mellitus  Hyperlipidemia, unspecified hyperlipidemia type  Insulin dependent diabetes mellitus  Coronary artery disease involving native coronary artery of native heart with unstable angina pectoris  Transition of care performed with sharing of clinical summary  Nutrition, metabolism, and development symptoms  Need for prophylactic measure  STEMI (ST elevation myocardial infarction)  No significant past surgical history      FH:  Mother with history of CAD.    SH:  Remote tobacco use. Social alcohol use.    Current Meds:  aspirin enteric coated 81 milliGRAM(s) Oral daily  atorvastatin 80 milliGRAM(s) Oral at bedtime  chlorhexidine 2% Cloths 1 Application(s) Topical daily  dextrose 40% Gel 15 Gram(s) Oral once PRN  dextrose 5%. 1000 milliLiter(s) IV Continuous <Continuous>  dextrose 50% Injectable 12.5 Gram(s) IV Push once  dextrose 50% Injectable 25 Gram(s) IV Push once  dextrose 50% Injectable 25 Gram(s) IV Push once  glucagon  Injectable 1 milliGRAM(s) IntraMuscular once PRN  heparin  Injectable 5000 Unit(s) SubCutaneous every 8 hours  insulin lispro (HumaLOG) corrective regimen sliding scale   SubCutaneous Before meals and at bedtime  lisinopril 2.5 milliGRAM(s) Oral daily  metoprolol tartrate 12.5 milliGRAM(s) Oral once  ticagrelor 90 milliGRAM(s) Oral two times a day      Allergies:  No Known Allergies      ROS:  Denies the following except as indicated.    General: weight loss/weight gain, decreased appetite, fatigue  Eyes: Blurry vision, double vision, visual changes  ENT: Throat pain, changes in voice,   CV: palpitations, SOB, CP, cough  GI: NVD, difficulty swallowing, abdominal pain  : polyuria, dysuria  Endo: abnormal menses, temperature intolerance, decreased libido  MSK: weakness, joint pain  Skin: rash, dryness, diaphoresis  Heme: Easy bruising,bleeding  Neuro: HA, dizziness, lightheadedness, numbness tingling  Psych: Anxiety, Depression    Vital Signs Last 24 Hrs  T(C): 37 (30 Dec 2018 13:00), Max: 37.5 (29 Dec 2018 16:22)  T(F): 98.6 (30 Dec 2018 13:00), Max: 99.5 (29 Dec 2018 16:22)  HR: 78 (30 Dec 2018 13:00) (54 - 100)  BP: 95/58 (30 Dec 2018 12:00) (72/57 - 102/60)  BP(mean): 71 (30 Dec 2018 12:00) (62 - 75)  RR: 29 (30 Dec 2018 13:00) (14 - 29)  SpO2: 97% (30 Dec 2018 13:00) (94% - 98%)  Height (cm): 171.45 (12-28 @ 20:45)  Weight (kg): 85 (12-28 @ 20:45)  BMI (kg/m2): 28.9 (12-28 @ 20:45)      Constitutional: wn/wd in NAD.   HEENT: NCAT, MMM, OP clear, EOMI, , no proptosis or lid retraction  Neck: no thyromegaly or palpable thyroid nodules   Respiratory: lungs CTAB.  Cardiovascular: regular rhythm, normal S1 and S2, no audible murmurs, no peripheral edema  GI: soft, NT/ND, no masses/HSM appreciated.  Neurology: no tremors, DTR 2+  Skin: no visible rashes/lesions  Psychiatric: AAO x 3, normal affect/mood.  Ext: radial pulses intact, DP pulses intact, extremities warm, no cyanosis, clubbing or edema.       LABS:                        13.9   9.0   )-----------( 243      ( 30 Dec 2018 04:39 )             41.6     12-30    137  |  101  |  19  ----------------------------<  230<H>  4.0   |  17<L>  |  0.74    Ca    8.4      30 Dec 2018 05:39  Mg     2.4     12-29    TPro  7.8  /  Alb  4.4  /  TBili  0.3  /  DBili  x   /  AST  23  /  ALT  26  /  AlkPhos  93  12-28    PT/INR - ( 28 Dec 2018 21:21 )   PT: 11.0 sec;   INR: 0.97          PTT - ( 28 Dec 2018 21:21 )  PTT:53.5 sec    Hemoglobin A1C, Whole Blood: 7.9 (12-28 @ 21:21)    Thyroid Stimulating Hormone, Serum: 1.736 (12-28 @ 21:21)      RADIOLOGY & ADDITIONAL STUDIES:  CAPILLARY BLOOD GLUCOSE      POCT Blood Glucose.: 241 mg/dL (30 Dec 2018 11:39)  POCT Blood Glucose.: 199 mg/dL (30 Dec 2018 06:29)  POCT Blood Glucose.: 231 mg/dL (29 Dec 2018 21:45)  POCT Blood Glucose.: 221 mg/dL (29 Dec 2018 16:18)        A/P: 58 year old male with PMH of type 2 DM (Hg A1c 7.9%) HLD, and hx of tobacco use disorder that admitted from [facility] for inferior STEMI. He is s/p CINDY to [facility]. Plan is for discharge home today.    1.  DM, uncontrolled, complicated  Advised patient to resume current regimen for now and to call our office tomorrow to schedule an appointment as soon as possible for further adjustment in regimen. Jardiance or Invokana may be good options for him due to indication for heart health instead of current oral regimen; may need prior authorization.    Pt advised to follow-up at discharge with [facility] by calling  to make an appointment as soon as possible. HPI: 58 year old male with PMH of type 2 DM (Hg A1c 7.9%) HLD, and hx of tobacco use disorder that admitted from [facility] for inferior STEMI. He is s/p CINDY to [facility]. Plan is for discharge home today.    Age at Dx: Diagnosed around 2012  Hx and duration of insulin/current therapy: He states he was well controlled on metformin 1000 mg twice daily and Tradjenta 5 mg daily. Unfortunately, his insurance changed and Tradjenta no longer covered. He was changed to another oral medication that was not effective, and subsequently on a regimen of Basaglar 25 units at night, metformin 1000 mg twice daily, glipizide unknown dose at night, another oral medication in the morning. His pharmacy is currently closed. His medication list is in his car, left behind when he developed chest pain and presented for immediate evaluation.  No hospitalizations for hypo- or hyperglycemia.    He checks blood sugars twice a day at home, most recently in the 200s mg/dL. He states blood sugars were well controlled on metformin and Tradjenta with all at goal and Hg A1c 6.8%.    Hx of other regimens  Complications: CAD; no microvascular complications per his report  Outpatient Endo: None, PCP    PMH & Surgical Hx:STEMI  Family history of coronary artery disease (Mother, Grandparent)  Family history of coronary artery bypass graft (Mother)  Handoff  Other hyperlipidemia  Insulin dependent diabetes mellitus  Hyperlipidemia, unspecified hyperlipidemia type  Insulin dependent diabetes mellitus  Coronary artery disease involving native coronary artery of native heart with unstable angina pectoris  Transition of care performed with sharing of clinical summary  Nutrition, metabolism, and development symptoms  Need for prophylactic measure  STEMI (ST elevation myocardial infarction)  No significant past surgical history      FH:  Mother with history of CAD.    SH:  Remote tobacco use. Social alcohol use.    Current Meds:  aspirin enteric coated 81 milliGRAM(s) Oral daily  atorvastatin 80 milliGRAM(s) Oral at bedtime  chlorhexidine 2% Cloths 1 Application(s) Topical daily  dextrose 40% Gel 15 Gram(s) Oral once PRN  dextrose 5%. 1000 milliLiter(s) IV Continuous <Continuous>  dextrose 50% Injectable 12.5 Gram(s) IV Push once  dextrose 50% Injectable 25 Gram(s) IV Push once  dextrose 50% Injectable 25 Gram(s) IV Push once  glucagon  Injectable 1 milliGRAM(s) IntraMuscular once PRN  heparin  Injectable 5000 Unit(s) SubCutaneous every 8 hours  insulin lispro (HumaLOG) corrective regimen sliding scale   SubCutaneous Before meals and at bedtime  lisinopril 2.5 milliGRAM(s) Oral daily  metoprolol tartrate 12.5 milliGRAM(s) Oral once  ticagrelor 90 milliGRAM(s) Oral two times a day      Allergies:  No Known Allergies      ROS:  Denies the following except as indicated.    General: weight loss/weight gain, decreased appetite, fatigue  Eyes: Blurry vision, double vision, visual changes  ENT: Throat pain, changes in voice,   CV: palpitations, SOB, CP, cough  GI: NVD, difficulty swallowing, abdominal pain  : polyuria, dysuria  Endo: abnormal menses, temperature intolerance, decreased libido  MSK: weakness, joint pain  Skin: rash, dryness, diaphoresis  Heme: Easy bruising,bleeding  Neuro: HA, dizziness, lightheadedness, numbness tingling  Psych: Anxiety, Depression    Vital Signs Last 24 Hrs  T(C): 37 (30 Dec 2018 13:00), Max: 37.5 (29 Dec 2018 16:22)  T(F): 98.6 (30 Dec 2018 13:00), Max: 99.5 (29 Dec 2018 16:22)  HR: 78 (30 Dec 2018 13:00) (54 - 100)  BP: 95/58 (30 Dec 2018 12:00) (72/57 - 102/60)  BP(mean): 71 (30 Dec 2018 12:00) (62 - 75)  RR: 29 (30 Dec 2018 13:00) (14 - 29)  SpO2: 97% (30 Dec 2018 13:00) (94% - 98%)  Height (cm): 171.45 (12-28 @ 20:45)  Weight (kg): 85 (12-28 @ 20:45)  BMI (kg/m2): 28.9 (12-28 @ 20:45)      Constitutional: wn/wd in NAD.   HEENT: NCAT, MMM, OP clear, EOMI, , no proptosis or lid retraction  Neck: no thyromegaly or palpable thyroid nodules   Respiratory: lungs CTAB.  Cardiovascular: regular rhythm, normal S1 and S2, no audible murmurs, no peripheral edema  GI: soft, NT/ND, no masses/HSM appreciated.  Neurology: no tremors, DTR 2+  Skin: no visible rashes/lesions  Psychiatric: AAO x 3, normal affect/mood.  Ext: radial pulses intact, DP pulses intact, extremities warm, no cyanosis, clubbing or edema.       LABS:                        13.9   9.0   )-----------( 243      ( 30 Dec 2018 04:39 )             41.6     12-30    137  |  101  |  19  ----------------------------<  230<H>  4.0   |  17<L>  |  0.74    Ca    8.4      30 Dec 2018 05:39  Mg     2.4     12-29    TPro  7.8  /  Alb  4.4  /  TBili  0.3  /  DBili  x   /  AST  23  /  ALT  26  /  AlkPhos  93  12-28    PT/INR - ( 28 Dec 2018 21:21 )   PT: 11.0 sec;   INR: 0.97          PTT - ( 28 Dec 2018 21:21 )  PTT:53.5 sec    Hemoglobin A1C, Whole Blood: 7.9 (12-28 @ 21:21)    Thyroid Stimulating Hormone, Serum: 1.736 (12-28 @ 21:21)      RADIOLOGY & ADDITIONAL STUDIES:  CAPILLARY BLOOD GLUCOSE      POCT Blood Glucose.: 241 mg/dL (30 Dec 2018 11:39)  POCT Blood Glucose.: 199 mg/dL (30 Dec 2018 06:29)  POCT Blood Glucose.: 231 mg/dL (29 Dec 2018 21:45)  POCT Blood Glucose.: 221 mg/dL (29 Dec 2018 16:18)        A/P: 58 year old male with PMH of type 2 DM (Hg A1c 7.9%) HLD, and hx of tobacco use disorder that admitted from [facility] for inferior STEMI. He is s/p CINDY to [facility]. Plan is for discharge home today.    1.  DM, uncontrolled, complicated  Advised patient to resume current regimen for now and to call our office tomorrow to schedule an appointment as soon as possible for further adjustment in regimen. Jardiance or Invokana may be good options for him due to indication for heart health instead of current oral regimen; may need prior authorization.    Pt advised to follow-up at discharge with [facility] by calling  to make an appointment as soon as possible.     I was physically present for the key portions of the evaluation and management (E/M) service provided.  I agree with the above history, physical, and plan which I have reviewed and edited where appropriate.     45 minutes spent on total encounter; more than 50% of the visit was spent counseling and/or coordinating care by the attending physician.

## 2018-12-30 NOTE — PROGRESS NOTE ADULT - SUBJECTIVE AND OBJECTIVE BOX
INTERVAL HPI/OVERNIGHT EVENTS:    SUBJECTIVE: Patient seen and examined at bedside.    OBJECTIVE:    VITAL SIGNS:  ICU Vital Signs Last 24 Hrs  T(C): 37 (30 Dec 2018 13:00), Max: 37.5 (29 Dec 2018 16:22)  T(F): 98.6 (30 Dec 2018 13:00), Max: 99.5 (29 Dec 2018 16:22)  HR: 76 (30 Dec 2018 14:00) (54 - 100)  BP: 98/67 (30 Dec 2018 14:00) (72/57 - 102/60)  BP(mean): 76 (30 Dec 2018 14:00) (62 - 76)  ABP: --  ABP(mean): --  RR: 32 (30 Dec 2018 14:00) (14 - 32)  SpO2: 96% (30 Dec 2018 14:00) (94% - 98%)        12-29 @ 07:01  -  12-30 @ 07:00  --------------------------------------------------------  IN: 440 mL / OUT: 1450 mL / NET: -1010 mL    12-30 @ 07:01  -  12-30 @ 14:54  --------------------------------------------------------  IN: 320 mL / OUT: 470 mL / NET: -150 mL      CAPILLARY BLOOD GLUCOSE      POCT Blood Glucose.: 241 mg/dL (30 Dec 2018 11:39)      PHYSICAL EXAM:    General: NAD  HEENT: NC/AT; PERRL, clear conjunctiva  Neck: supple  Respiratory: CTA b/l  Cardiovascular: +S1/S2; RRR  Abdomen: soft, NT/ND; +BS x4  Extremities: WWP, 2+ peripheral pulses b/l; no LE edema  Skin: normal color and turgor; no rash  Neurological:     MEDICATIONS:  MEDICATIONS  (STANDING):  aspirin enteric coated 81 milliGRAM(s) Oral daily  atorvastatin 80 milliGRAM(s) Oral at bedtime  chlorhexidine 2% Cloths 1 Application(s) Topical daily  dextrose 5%. 1000 milliLiter(s) (50 mL/Hr) IV Continuous <Continuous>  dextrose 50% Injectable 12.5 Gram(s) IV Push once  dextrose 50% Injectable 25 Gram(s) IV Push once  dextrose 50% Injectable 25 Gram(s) IV Push once  heparin  Injectable 5000 Unit(s) SubCutaneous every 8 hours  insulin lispro (HumaLOG) corrective regimen sliding scale   SubCutaneous Before meals and at bedtime  lisinopril 2.5 milliGRAM(s) Oral daily  metoprolol tartrate 12.5 milliGRAM(s) Oral once  ticagrelor 90 milliGRAM(s) Oral two times a day    MEDICATIONS  (PRN):  dextrose 40% Gel 15 Gram(s) Oral once PRN Blood Glucose LESS THAN 70 milliGRAM(s)/deciliter  glucagon  Injectable 1 milliGRAM(s) IntraMuscular once PRN Glucose LESS THAN 70 milligrams/deciliter      ALLERGIES:  Allergies    No Known Allergies    Intolerances        LABS:                        13.9   9.0   )-----------( 243      ( 30 Dec 2018 04:39 )             41.6     12-30    137  |  101  |  19  ----------------------------<  230<H>  4.0   |  17<L>  |  0.74    Ca    8.4      30 Dec 2018 05:39  Mg     2.4     12-29    TPro  7.8  /  Alb  4.4  /  TBili  0.3  /  DBili  x   /  AST  23  /  ALT  26  /  AlkPhos  93  12-28    PT/INR - ( 28 Dec 2018 21:21 )   PT: 11.0 sec;   INR: 0.97          PTT - ( 28 Dec 2018 21:21 )  PTT:53.5 sec      RADIOLOGY & ADDITIONAL TESTS: Reviewed. entered in error

## 2018-12-30 NOTE — DISCHARGE NOTE ADULT - SECONDARY DIAGNOSIS.
Coronary artery disease involving native coronary artery of native heart with unstable angina pectoris Hyperlipidemia, unspecified hyperlipidemia type Insulin dependent diabetes mellitus

## 2018-12-30 NOTE — DISCHARGE NOTE ADULT - HOSPITAL COURSE
58 year old male with PMH of IDDM, HLD, and hx of tobacco use disorder transferred from [facility] for inferior STEMI on 12/28/18. STEMI code was activated and pt underwent cardiac cath which demonstrated LM= wnl, Lcx= 65%, OM1= 65%, LAD= diffuse moderate non obstructive CAD, D1= diffuse, moderate, non obstructive CAD, mRCA=99%, LVEF= 35-40% with inferior wall hypokinesis, LVEDP=  10 mmHg. Pt received 1 CINDY to [facility]. Pt admitted to CCU for observation and further management. Pt was started on ASA, brilinta, lipitor, lisinopril, and metoprolol. Chest pain improved. Endocrine was consulted for management of poorly controlled diabetes, FSG monitored and controlled with . Pt currently hemodynamically stable, clinically improved and stable for discharge home with outpatient cardiology and endocrine follow up.

## 2018-12-30 NOTE — DISCHARGE NOTE ADULT - MEDICATION SUMMARY - MEDICATIONS TO TAKE
I will START or STAY ON the medications listed below when I get home from the hospital:    aspirin 81 mg oral delayed release tablet  -- 1 tab(s) by mouth once a day  -- Indication: For STEMI (ST elevation myocardial infarction)    lisinopril 2.5 mg oral tablet  -- 1 tab(s) by mouth once a day  -- Indication: For Coronary artery disease involving native coronary artery of native heart with unstable angina pectoris    metFORMIN 1000 mg oral tablet  -- 1 tab(s) by mouth 2 times a day   -- Check with your doctor before becoming pregnant.  Do not drink alcoholic beverages when taking this medication.  It is very important that you take or use this exactly as directed.  Do not skip doses or discontinue unless directed by your doctor.  Obtain medical advice before taking any non-prescription drugs as some may affect the action of this medication.  Take with food or milk.    -- Indication: For diabetes    Basaglar KwikPen 100 units/mL subcutaneous solution  -- 25 unit(s) subcutaneous once a day (at bedtime)   -- Do not drink alcoholic beverages when taking this medication.  It is very important that you take or use this exactly as directed.  Do not skip doses or discontinue unless directed by your doctor.  Keep in refrigerator.  Do not freeze.    -- Indication: For diabetes    atorvastatin 80 mg oral tablet  -- 1 tab(s) by mouth once a day (at bedtime)  -- Indication: For Other hyperlipidemia    Brilinta (ticagrelor) 90 mg oral tablet  -- 1 tab(s) by mouth every 12 hours   -- It is very important that you take or use this exactly as directed.  Do not skip doses or discontinue unless directed by your doctor.  Obtain medical advice before taking any non-prescription drugs as some may affect the action of this medication.    -- Indication: For STEMI (ST elevation myocardial infarction)    metoprolol succinate 25 mg oral tablet, extended release  -- 1 tab(s) by mouth once a day  -- Indication: For Coronary artery disease involving native coronary artery of native heart with unstable angina pectoris

## 2018-12-30 NOTE — DISCHARGE NOTE ADULT - CARE PROVIDER_API CALL
Tristen Douglas), Internal Medicine  [address] [id number]  Phone: [phone]  Fax: [phone]    [facility]  [facility]  [address] [place], [place]    Please call  to make an appointment as soon as possible.  Phone: [phone]  Fax: (   )    -

## 2019-01-03 DIAGNOSIS — E10.65 TYPE 1 DIABETES MELLITUS WITH HYPERGLYCEMIA: ICD-10-CM

## 2019-01-03 DIAGNOSIS — Z79.82 LONG TERM (CURRENT) USE OF ASPIRIN: ICD-10-CM

## 2019-01-03 DIAGNOSIS — Z72.0 TOBACCO USE: ICD-10-CM

## 2019-01-03 DIAGNOSIS — I25.110 ATHEROSCLEROTIC HEART DISEASE OF NATIVE CORONARY ARTERY WITH UNSTABLE ANGINA PECTORIS: ICD-10-CM

## 2019-01-03 DIAGNOSIS — I25.10 ATHEROSCLEROTIC HEART DISEASE OF NATIVE CORONARY ARTERY WITHOUT ANGINA PECTORIS: ICD-10-CM

## 2019-01-03 DIAGNOSIS — E10.8 TYPE 1 DIABETES MELLITUS WITH UNSPECIFIED COMPLICATIONS: ICD-10-CM

## 2019-01-03 DIAGNOSIS — I21.19 ST ELEVATION (STEMI) MYOCARDIAL INFARCTION INVOLVING OTHER CORONARY ARTERY OF INFERIOR WALL: ICD-10-CM

## 2019-01-03 DIAGNOSIS — E78.5 HYPERLIPIDEMIA, UNSPECIFIED: ICD-10-CM

## 2019-01-14 PROCEDURE — 71045 X-RAY EXAM CHEST 1 VIEW: CPT

## 2019-01-14 PROCEDURE — C1769: CPT

## 2019-01-14 PROCEDURE — 85610 PROTHROMBIN TIME: CPT

## 2019-01-14 PROCEDURE — C1894: CPT

## 2019-01-14 PROCEDURE — 36415 COLL VENOUS BLD VENIPUNCTURE: CPT

## 2019-01-14 PROCEDURE — 82962 GLUCOSE BLOOD TEST: CPT

## 2019-01-14 PROCEDURE — 85730 THROMBOPLASTIN TIME PARTIAL: CPT

## 2019-01-14 PROCEDURE — 83036 HEMOGLOBIN GLYCOSYLATED A1C: CPT

## 2019-01-14 PROCEDURE — 80061 LIPID PANEL: CPT

## 2019-01-14 PROCEDURE — 80048 BASIC METABOLIC PNL TOTAL CA: CPT

## 2019-01-14 PROCEDURE — 83735 ASSAY OF MAGNESIUM: CPT

## 2019-01-14 PROCEDURE — C1725: CPT

## 2019-01-14 PROCEDURE — 93306 TTE W/DOPPLER COMPLETE: CPT

## 2019-01-14 PROCEDURE — 84443 ASSAY THYROID STIM HORMONE: CPT

## 2019-01-14 PROCEDURE — 85027 COMPLETE CBC AUTOMATED: CPT

## 2019-01-14 PROCEDURE — C1874: CPT

## 2019-01-14 PROCEDURE — 80053 COMPREHEN METABOLIC PANEL: CPT

## 2019-01-14 PROCEDURE — C1887: CPT

## 2019-04-04 PROBLEM — Z00.00 ENCOUNTER FOR PREVENTIVE HEALTH EXAMINATION: Status: ACTIVE | Noted: 2019-04-04

## 2019-05-01 ENCOUNTER — OUTPATIENT (OUTPATIENT)
Dept: OUTPATIENT SERVICES | Facility: HOSPITAL | Age: 59
LOS: 1 days | End: 2019-05-01
Payer: MEDICAID

## 2019-05-01 PROCEDURE — G9001: CPT

## 2019-05-21 DIAGNOSIS — Z71.89 OTHER SPECIFIED COUNSELING: ICD-10-CM

## 2019-05-21 PROBLEM — E78.49 OTHER HYPERLIPIDEMIA: Chronic | Status: ACTIVE | Noted: 2018-12-28

## 2019-05-21 PROBLEM — E11.9 TYPE 2 DIABETES MELLITUS WITHOUT COMPLICATIONS: Chronic | Status: ACTIVE | Noted: 2018-12-28
